# Patient Record
Sex: MALE | Race: WHITE | NOT HISPANIC OR LATINO | Employment: FULL TIME | URBAN - METROPOLITAN AREA
[De-identification: names, ages, dates, MRNs, and addresses within clinical notes are randomized per-mention and may not be internally consistent; named-entity substitution may affect disease eponyms.]

---

## 2023-12-24 ENCOUNTER — APPOINTMENT (OUTPATIENT)
Dept: CARDIOLOGY | Facility: HOSPITAL | Age: 28
End: 2023-12-24

## 2023-12-24 ENCOUNTER — HOSPITAL ENCOUNTER (EMERGENCY)
Facility: HOSPITAL | Age: 28
Discharge: OTHER INSTITUTION | End: 2023-12-28
Attending: INTERNAL MEDICINE

## 2023-12-24 DIAGNOSIS — R45.851 SUICIDAL IDEATION: Primary | ICD-10-CM

## 2023-12-24 LAB
ALBUMIN SERPL BCP-MCNC: 4.4 G/DL (ref 3.4–5)
ALP SERPL-CCNC: 83 U/L (ref 33–120)
ALT SERPL W P-5'-P-CCNC: 15 U/L (ref 10–52)
AMPHETAMINES UR QL SCN: NORMAL
ANION GAP SERPL CALC-SCNC: 12 MMOL/L (ref 10–20)
APAP SERPL-MCNC: <10 UG/ML
AST SERPL W P-5'-P-CCNC: 16 U/L (ref 9–39)
BARBITURATES UR QL SCN: NORMAL
BASOPHILS # BLD AUTO: 0.04 X10*3/UL (ref 0–0.1)
BASOPHILS NFR BLD AUTO: 0.4 %
BENZODIAZ UR QL SCN: NORMAL
BILIRUB SERPL-MCNC: 0.7 MG/DL (ref 0–1.2)
BUN SERPL-MCNC: 8 MG/DL (ref 6–23)
BZE UR QL SCN: NORMAL
CALCIUM SERPL-MCNC: 8.7 MG/DL (ref 8.6–10.3)
CANNABINOIDS UR QL SCN: NORMAL
CHLORIDE SERPL-SCNC: 106 MMOL/L (ref 98–107)
CO2 SERPL-SCNC: 23 MMOL/L (ref 21–32)
CREAT SERPL-MCNC: 0.95 MG/DL (ref 0.5–1.3)
EOSINOPHIL # BLD AUTO: 0.12 X10*3/UL (ref 0–0.7)
EOSINOPHIL NFR BLD AUTO: 1.2 %
ERYTHROCYTE [DISTWIDTH] IN BLOOD BY AUTOMATED COUNT: 12.2 % (ref 11.5–14.5)
ETHANOL SERPL-MCNC: <10 MG/DL
FENTANYL+NORFENTANYL UR QL SCN: NORMAL
GFR SERPL CREATININE-BSD FRML MDRD: >90 ML/MIN/1.73M*2
GLUCOSE SERPL-MCNC: 104 MG/DL (ref 74–99)
HCT VFR BLD AUTO: 49.6 % (ref 41–52)
HGB BLD-MCNC: 17.5 G/DL (ref 13.5–17.5)
IMM GRANULOCYTES # BLD AUTO: 0.03 X10*3/UL (ref 0–0.7)
IMM GRANULOCYTES NFR BLD AUTO: 0.3 % (ref 0–0.9)
LYMPHOCYTES # BLD AUTO: 1.24 X10*3/UL (ref 1.2–4.8)
LYMPHOCYTES NFR BLD AUTO: 12.8 %
MCH RBC QN AUTO: 31.5 PG (ref 26–34)
MCHC RBC AUTO-ENTMCNC: 35.3 G/DL (ref 32–36)
MCV RBC AUTO: 89 FL (ref 80–100)
MONOCYTES # BLD AUTO: 0.61 X10*3/UL (ref 0.1–1)
MONOCYTES NFR BLD AUTO: 6.3 %
NEUTROPHILS # BLD AUTO: 7.63 X10*3/UL (ref 1.2–7.7)
NEUTROPHILS NFR BLD AUTO: 79 %
NRBC BLD-RTO: 0 /100 WBCS (ref 0–0)
OPIATES UR QL SCN: NORMAL
OXYCODONE+OXYMORPHONE UR QL SCN: NORMAL
PCP UR QL SCN: NORMAL
PLATELET # BLD AUTO: 230 X10*3/UL (ref 150–450)
POTASSIUM SERPL-SCNC: 3.2 MMOL/L (ref 3.5–5.3)
PROT SERPL-MCNC: 6.7 G/DL (ref 6.4–8.2)
RBC # BLD AUTO: 5.56 X10*6/UL (ref 4.5–5.9)
SALICYLATES SERPL-MCNC: <3 MG/DL
SARS-COV-2 RNA RESP QL NAA+PROBE: NOT DETECTED
SODIUM SERPL-SCNC: 138 MMOL/L (ref 136–145)
WBC # BLD AUTO: 9.7 X10*3/UL (ref 4.4–11.3)

## 2023-12-24 PROCEDURE — 93005 ELECTROCARDIOGRAM TRACING: CPT

## 2023-12-24 PROCEDURE — 80179 DRUG ASSAY SALICYLATE: CPT | Performed by: PHYSICIAN ASSISTANT

## 2023-12-24 PROCEDURE — 80053 COMPREHEN METABOLIC PANEL: CPT | Performed by: PHYSICIAN ASSISTANT

## 2023-12-24 PROCEDURE — 99285 EMERGENCY DEPT VISIT HI MDM: CPT | Performed by: INTERNAL MEDICINE

## 2023-12-24 PROCEDURE — 80307 DRUG TEST PRSMV CHEM ANLYZR: CPT | Performed by: PHYSICIAN ASSISTANT

## 2023-12-24 PROCEDURE — 36415 COLL VENOUS BLD VENIPUNCTURE: CPT | Performed by: PHYSICIAN ASSISTANT

## 2023-12-24 PROCEDURE — 87635 SARS-COV-2 COVID-19 AMP PRB: CPT | Performed by: PHYSICIAN ASSISTANT

## 2023-12-24 PROCEDURE — 85025 COMPLETE CBC W/AUTO DIFF WBC: CPT | Performed by: PHYSICIAN ASSISTANT

## 2023-12-24 PROCEDURE — 2500000004 HC RX 250 GENERAL PHARMACY W/ HCPCS (ALT 636 FOR OP/ED): Performed by: PHYSICIAN ASSISTANT

## 2023-12-24 RX ORDER — POTASSIUM CHLORIDE 20 MEQ/1
40 TABLET, EXTENDED RELEASE ORAL ONCE
Status: COMPLETED | OUTPATIENT
Start: 2023-12-24 | End: 2023-12-24

## 2023-12-24 RX ADMIN — POTASSIUM CHLORIDE 40 MEQ: 1500 TABLET, EXTENDED RELEASE ORAL at 05:01

## 2023-12-24 SDOH — HEALTH STABILITY: MENTAL HEALTH: HAVE YOU WISHED YOU WERE DEAD OR WISHED YOU COULD GO TO SLEEP AND NOT WAKE UP?: YES

## 2023-12-24 SDOH — HEALTH STABILITY: MENTAL HEALTH: DEPRESSION SYMPTOMS: FEELINGS OF HELPLESSNESS;FEELINGS OF HOPELESSESS;ISOLATIVE;LOSS OF INTEREST

## 2023-12-24 SDOH — HEALTH STABILITY: MENTAL HEALTH: HAVE YOU EVER DONE ANYTHING, STARTED TO DO ANYTHING, OR PREPARED TO DO ANYTHING TO END YOUR LIFE?: YES

## 2023-12-24 SDOH — HEALTH STABILITY: MENTAL HEALTH: RISK OF SUICIDE: HIGH RISK

## 2023-12-24 SDOH — SOCIAL STABILITY: SOCIAL INSECURITY: FAMILY BEHAVIORS: CALM;FLAT AFFECT

## 2023-12-24 SDOH — HEALTH STABILITY: MENTAL HEALTH: HAVE YOU BEEN THINKING ABOUT HOW YOU MIGHT DO THIS?: YES

## 2023-12-24 SDOH — HEALTH STABILITY: MENTAL HEALTH: WAS THIS WITHIN THE PAST THREE MONTHS?: YES

## 2023-12-24 SDOH — HEALTH STABILITY: MENTAL HEALTH: HAVE YOU HAD THESE THOUGHTS AND HAD SOME INTENTION OF ACTING ON THEM?: YES

## 2023-12-24 SDOH — HEALTH STABILITY: MENTAL HEALTH
HAVE YOU STARTED TO WORK OUT OR WORKED OUT THE DETAILS OF HOW TO KILL YOURSELF? DO YOU INTENT TO CARRY OUT THIS PLAN?: YES

## 2023-12-24 SDOH — HEALTH STABILITY: MENTAL HEALTH: BEHAVIORS/MOOD: CALM;COOPERATIVE

## 2023-12-24 SDOH — HEALTH STABILITY: MENTAL HEALTH: HOW DID YOU TRY TO KILL YOURSELF?: CUTTING

## 2023-12-24 SDOH — HEALTH STABILITY: MENTAL HEALTH: HAVE YOU ACTUALLY HAD ANY THOUGHTS OF KILLING YOURSELF?: YES

## 2023-12-24 SDOH — HEALTH STABILITY: MENTAL HEALTH

## 2023-12-24 SDOH — ECONOMIC STABILITY: HOUSING INSECURITY: FEELS SAFE LIVING IN HOME: YES

## 2023-12-24 SDOH — ECONOMIC STABILITY: GENERAL: FINANCIAL CONCERNS: UNABLE TO PAY BILLS

## 2023-12-24 SDOH — HEALTH STABILITY: MENTAL HEALTH: ACTIVE SUICIDAL IDEATION WITH SPECIFIC PLAN AND INTENT (PAST 1 MONTH): YES

## 2023-12-24 SDOH — HEALTH STABILITY: MENTAL HEALTH: WISH TO BE DEAD (PAST 1 MONTH): YES

## 2023-12-24 SDOH — HEALTH STABILITY: MENTAL HEALTH: IN THE PAST WEEK, HAVE YOU BEEN HAVING THOUGHTS ABOUT KILLING YOURSELF?: YES

## 2023-12-24 SDOH — HEALTH STABILITY: MENTAL HEALTH: SUICIDAL BEHAVIOR (DESCRIPTION): CUTTING

## 2023-12-24 SDOH — HEALTH STABILITY: MENTAL HEALTH: SLEEP PATTERN: INSOMNIA

## 2023-12-24 SDOH — HEALTH STABILITY: MENTAL HEALTH: SUICIDAL BEHAVIOR (3 MONTHS): YES

## 2023-12-24 SDOH — HEALTH STABILITY: MENTAL HEALTH: SUICIDE ASSESSMENT: ADULT (C-SSRS)

## 2023-12-24 SDOH — HEALTH STABILITY: MENTAL HEALTH: HAVE YOU EVER TRIED TO KILL YOURSELF?: YES

## 2023-12-24 SDOH — HEALTH STABILITY: MENTAL HEALTH: SUICIDAL BEHAVIOR (LIFETIME): YES

## 2023-12-24 SDOH — HEALTH STABILITY: MENTAL HEALTH: HALLUCINATION: UNABLE TO ASSESS

## 2023-12-24 SDOH — HEALTH STABILITY: MENTAL HEALTH: IN THE PAST FEW WEEKS, HAVE YOU WISHED YOU WERE DEAD?: YES

## 2023-12-24 SDOH — HEALTH STABILITY: MENTAL HEALTH: DESCRIBE YOUR THOUGHTS OF KILLING YOURSELF RIGHT NOW:: NO PLAN CURRENTLY

## 2023-12-24 SDOH — HEALTH STABILITY: MENTAL HEALTH: IN THE PAST FEW WEEKS, HAVE YOU FELT THAT YOU OR YOUR FAMILY WOULD BE BETTER OFF IF YOU WERE DEAD?: YES

## 2023-12-24 SDOH — HEALTH STABILITY: MENTAL HEALTH: ACTIVE SUICIDAL IDEATION WITH SOME INTENT TO ACT, WITHOUT SPECIFIC PLAN (PAST 1 MONTH): YES

## 2023-12-24 SDOH — HEALTH STABILITY: MENTAL HEALTH: NON-SPECIFIC ACTIVE SUICIDAL THOUGHTS (PAST 1 MONTH): YES

## 2023-12-24 SDOH — HEALTH STABILITY: MENTAL HEALTH: DELUSIONS: CONTROLLED

## 2023-12-24 SDOH — HEALTH STABILITY: MENTAL HEALTH: ANXIETY SYMPTOMS: NO PROBLEMS REPORTED OR OBSERVED.

## 2023-12-24 SDOH — HEALTH STABILITY: MENTAL HEALTH: ARE YOU HAVING THOUGHTS OF KILLING YOURSELF RIGHT NOW?: YES

## 2023-12-24 ASSESSMENT — LIFESTYLE VARIABLES
EVER FELT BAD OR GUILTY ABOUT YOUR DRINKING: NO
HAVE YOU EVER FELT YOU SHOULD CUT DOWN ON YOUR DRINKING: NO
SUBSTANCE_ABUSE_PAST_12_MONTHS: NO
REASON UNABLE TO ASSESS: NO
PRESCIPTION_ABUSE_PAST_12_MONTHS: NO
EVER HAD A DRINK FIRST THING IN THE MORNING TO STEADY YOUR NERVES TO GET RID OF A HANGOVER: NO
HAVE PEOPLE ANNOYED YOU BY CRITICIZING YOUR DRINKING: NO

## 2023-12-24 ASSESSMENT — COLUMBIA-SUICIDE SEVERITY RATING SCALE - C-SSRS
1. IN THE PAST MONTH, HAVE YOU WISHED YOU WERE DEAD OR WISHED YOU COULD GO TO SLEEP AND NOT WAKE UP?: YES
2. HAVE YOU ACTUALLY HAD ANY THOUGHTS OF KILLING YOURSELF?: YES
6. HAVE YOU EVER DONE ANYTHING, STARTED TO DO ANYTHING, OR PREPARED TO DO ANYTHING TO END YOUR LIFE?: YES
4. HAVE YOU HAD THESE THOUGHTS AND HAD SOME INTENTION OF ACTING ON THEM?: YES
5. HAVE YOU STARTED TO WORK OUT OR WORKED OUT THE DETAILS OF HOW TO KILL YOURSELF? DO YOU INTEND TO CARRY OUT THIS PLAN?: YES
6. HAVE YOU EVER DONE ANYTHING, STARTED TO DO ANYTHING, OR PREPARED TO DO ANYTHING TO END YOUR LIFE?: YES
1. SINCE LAST CONTACT, HAVE YOU WISHED YOU WERE DEAD OR WISHED YOU COULD GO TO SLEEP AND NOT WAKE UP?: YES
2. HAVE YOU ACTUALLY HAD ANY THOUGHTS OF KILLING YOURSELF?: YES
6. HAVE YOU EVER DONE ANYTHING, STARTED TO DO ANYTHING, OR PREPARED TO DO ANYTHING TO END YOUR LIFE?: NO
5. HAVE YOU STARTED TO WORK OUT OR WORKED OUT THE DETAILS OF HOW TO KILL YOURSELF? DO YOU INTEND TO CARRY OUT THIS PLAN?: YES

## 2023-12-24 ASSESSMENT — PAIN DESCRIPTION - FREQUENCY: FREQUENCY: CONSTANT/CONTINUOUS

## 2023-12-24 ASSESSMENT — PAIN SCALES - GENERAL
PAINLEVEL_OUTOF10: 0 - NO PAIN
PAINLEVEL_OUTOF10: 0 - NO PAIN

## 2023-12-24 ASSESSMENT — PAIN - FUNCTIONAL ASSESSMENT: PAIN_FUNCTIONAL_ASSESSMENT: 0-10

## 2023-12-24 NOTE — PROGRESS NOTES
EPAT - Social Work Psychiatric Assessment    Arrival Details  Mode of Arrival: Ambulance  Admission Source:  ([facility])  Admission Type: Involuntary  EPAT Assessment Start Date: 12/24/23  EPAT Assessment Start Time: 0400  Name of : FATOU Luque LISW    History of Present Illness  Admission Reason: Suicidal    HPI: Pt, who is a 28 year old male, presents to the [facility] ED with a chief complaint of self-injury by cutting and suicidal ideation. Prior to assessment, pt’s provider note, triage note, and community record were reviewed. Pt was in the [facility] tonight waiting for a friend to get off work. “I was bored so I just did it,” according to pt. He cut himself with a knife he had in the car. Pt’s friend found pt bleeding and asked to take him to the ED. Pt refused so his fried surreptitiously called 911. Police and EMS arrived “so I didn’t have much of a choice” in coming to the ED. In the ED, pt triaged as “high risk” on his [place] risk screening tool. Pt reportedly was withdrawn and not providing much of a history when he spoke with ED provider. EPAT was consulted for further evaluation. For this assessment, pt reported that he does not care if he lives or dies but he is not actively suicidal. He is unsure of his intent behind the cutting tonight, specifically; whether or not he wanted to kill himself.         Pt denied having a previous psychiatric history. He does state “this isn’t my first rodeo. This is my second rodeo. My first rodeo was in [place].” Pt explained that his so called “first rodeo” was another episode of cutting. He was vague about whether he was actually admitted to inpatient psychiatry but he does report that he met with a psychiatrist who provided him with mental health information “but I threw it away because I don’t need another problem.” Pt denied having any history of psychiatric treatment beyond the one encounter in [place]. Pt has another ED visit  in May, 2023 to [facility]. At that time, pt claimed that he unintentionally cut himself with a “falling mirror.” Today, pt admits that the incident was intentional self-harm “but I had work so I lied.”         Pt was born and raised in [place]. When asked about a trauma history, pt claims “I can’t remember anything but flashes of stuff from before I was 10 years old.” Pt reported that he was homeschooled after he turned 10 years old. He keeps minimal contact with his mother but otherwise does not have contact with family. Pt moved to [place] 4 years ago. He lives with friends.     Readmission Information   Readmission within 30 Days: No    Psychiatric Symptoms  Anxiety Symptoms: No problems reported or observed.  Depression Symptoms: Feelings of helplessness, Feelings of hopelessess, Isolative, Loss of interest  Kimber Symptoms: No problems reported or observed.    Psychosis Symptoms  Hallucination Type: No problems reported or observed.  Delusion Type: No problems reported or observed.    Additional Symptoms - Adult  Generalized Anxiety Disorder: No problems reported or observed.  Obsessive Compulsive Disorder: No problems reported or observed.  Panic Attack: No problems reported or observed.  Post Traumatic Stress Disorder: No problems reported or observed.  Delirium: No problems reported or observed.    Past Psychiatric History/Meds/Treatments  Past Psychiatric History: Possible admission in 2017/2018  Past Psychiatric Meds/Treatments: Pt denied  Past Violence/Victimization History: Pt alluded to a history of trauma but did not provide specifics.    Current Mental Health Contacts   Name/Phone Number: None   Last Appointment Date: N/A  Provider Name/Phone Number: None  Provider Last Appointment Date: N/A    Support System: Friends    Living Arrangement: Apartment    Home Safety  Feels Safe Living in Home: Yes    Income Information  Employment Status for: Patient  Employment Status:  Employed  Income Source: Employed  Current/Previous Occupation: Service Industry  Income/Expense Information: Expenses exceed income  Financial Concerns: Unable to Pay Bills  Employment/ Finance Comments: Currently works at [employer] and [employer]. History of [employer] Service/Education History  Current or Previous  Service: None  Education Level:  (Home schooled)    Social/Cultural History  Social History: Pt is a 28 year old  male, pink dyed hair, born and raised in [place], living in [place] for 4 years.  Cultural Requests During Hospitalization: None  Spiritual Requests During Hospitalization: None  Important Activities:  (Pt unsure)    Legal  Legal Considerations: Patient/ Family Ability to Make Healthcare Decisions  Assistance with Managing/Advocating Healthcare Needs:  (Self)  Criminal Activity/ Legal Involvement Pertinent to Current Situation/ Hospitalization: None  Legal Concerns: None    Drug Screening  Have you used any substances (canabis, cocaine, heroin, hallucinogens, inhalants, etc.) in the past 12 months?: No  Have you used any prescription drugs other than prescribed in the past 12 months?: No  Is a toxicology screen needed?: Yes         Psychosocial  Behaviors/Mood: Calm, Cooperative, Pleasant    Orientation  Orientation Level: Oriented X4    General Appearance  Motor Activity: Unremarkable  Speech Pattern:  (Unremarkable)  General Attitude: Cooperative (Mildly guarded)  Appearance/Hygiene: Disheveled    Thought Process  Coherency: [place] thinking  Content:  (Endorsing SI)  Delusions:  (None)  Perception: Not altered  Hallucination: None  Judgment/Insight: Poor  Confusion: None  Cognition: Poor judgement         Risk Factors  Self Harm/Suicidal Ideation Plan: No active plan  Previous Self Harm/Suicidal Plans: Cut himself tonight  Risk Factors: Male    Violence Risk Assessment  Assessment of Violence: None noted  Thoughts of Harm to Others: No    Ability to Assess Risk  Screen  Risk Screen - Ability to Assess: Able to be screened  Ask Suicide-Screening Questions  1. In the past few weeks, have you wished you were dead?: Yes  2. In the past few weeks, have you felt that you or your family would be better off if you were dead?: Yes  3. In the past week, have you been having thoughts about killing yourself?: Yes  4. Have you ever tried to kill yourself?: Yes  How did you try to kill yourself?: cutting  When did you try to kill yourself?: 6 or 7 years ago  5. Are you having thoughts of killing yourself right now?: Yes  Describe your thoughts of killing yourself right now:: No plan currently  Calculated Risk Score: Imminent Risk  [place] Suicide Severity Rating Scale (Screener/Recent Self-Report)  1. Wish to be Dead (Past 1 Month): Yes  2. Non-Specific Active Suicidal Thoughts (Past 1 Month): Yes  3. Active Suicidal Ideation with any Methods (Not Plan) Without Intent to Act (Past 1 Month): Yes  4. Active Suicidal Ideation with Some Intent to Act, Without Specific Plan (Past 1 Month): Yes  5. Active Suicidal Ideation with Specific Plan and Intent (Past 1 Month): Yes  6. Suicidal Behavior (Lifetime): Yes  6. Suicidal Behavior (3 Months): Yes  6. Suicidal Behavior (Description): cutting  Calculated C-SSRS Risk Score (Lifetime/Recent): High Risk  Step 1: Risk Factors  Current & Past Psychiatric Dx: Mood disorder, Cluster B personality disorders or traits (i.e., borderline, antisocial, histrionic & narcissistic)  Presenting Symptoms: Impulsivity, Hopelessness or despair  Precipitants/Stressors: Inadequate social supports  Change in Treatment: Non-compliant or not receiving treatment  Access to Lethal Methods : No  Step 2: Protective Factors   Protective Factors External: Engaged in work or school  Step 3: Suicidal Ideation Intensity  How Many Times Have You Had These Thoughts: Daily or almost daily  When You Have the Thoughts How Long do They Last : Fleeting - few seconds or  minutes  Could/Can You Stop Thinking About Killing Yourself or Wanting to Die if You Want to: Does not attempt to control thoughts  Are There Things - Anyone or Anything - That Stopped You From Wanting to Die or Acting on: Uncertain that deterrents stopped you  What Sort of Reasons Did You Have For Thinking About Wanting to Die or Killing Yourself: Completely to end or stop the pain (you couldn't go on living with the pain or how you were feeling)  Total Score: 13  Step 5: Documentation  Risk Level: High suicide risk (Pt maintained at high risk. Discussed with Dr. Crenshaw)    Psychiatric Impression and Plan of Care    Assessment and Plan: Pt is a 28 year old male presenting for psychiatric evaluation with a chief complaint of self-injury and concern for suicidal ideation. On assessment, pt was calm and cooperative. Pt explained that he had intentionally cut himself tonight. When asked if he had suicidal intent, pt stated “That’s the problem…1. People die every day. I’m going to eventually die so who cares when? And 2. I’m not Presybeterian but I don’t have a lot of soheila in the world right now.” Pt never identified any specific stressors leading to cutting behaviors but he does admit the episodes are happening with more regularity. He often thinks about wanting to be dead but worries that his friends and family will be “annoyed with me if I actually did it.” The wound that pt inflicted on himself did require repair tonight. When asked if pt was depressed, he stated “well I was homeschooled so I don’t really know what depression looks like.” Pt presents with a flat affect. He appears to over intellectualize and explain away concerning behaviors as the obvious next step for him. For example, pt stated that he has always wanted to live independently but does not feel safe doing so because he knows he will try to kill himself. On the other hand, pt rejects mental health care because he does not “need another problem.” When  confronted about this apparent contradiction, pt states “I guess I try not to think about it.” He does not present with AH/VH/HI.         Dx: Unspecified depressive disorder         Plan: Pt meets criteria for inpatient psychiatric hospitalization because he poses an acute risk of harm to himself.    Specific Resources Provided to Patient: Inpatient  CM Notified: -  PHP/IOP Recommended: -  Specific Information Provided for PHP/IOP: -  Plan Comments: -    Outcome/Disposition  Patient's Perception of Outcome Achieved: Ambivelent  Assessment, Recommendations and Risk Level Reviewed with: Dr. Crenshaw  Contact Name: -  Contact Number(s): -  Contact Relationship: -  EPAT Assessment Completed Date: 12/24/23  EPAT Assessment Completed Time: 0529

## 2023-12-24 NOTE — ED PROVIDER NOTES
HPI   Chief Complaint   Patient presents with   • Suicidal   • Laceration     Lacerations to left arm self harm       28-year-old male presents today via police/EMS for suicidal ideation.  Patient was found in a parking lot causing superficial cuts to his wrists.  Police were subsequently called.  The patient was found to be uncooperative and voiced intent to harm himself.  On arrival the patient would not provide any information on what transpired today.                          [place] Coma Scale Score: 15                  Patient History   History reviewed. No pertinent past medical history.  History reviewed. No pertinent surgical history.  No family history on file.  Social History     Tobacco Use   • Smoking status: Every Day     Packs/day: 1     Types: Cigarettes   • Smokeless tobacco: Never   Vaping Use   • Vaping Use: Never used   Substance Use Topics   • Alcohol use: Not Currently   • Drug use: Not Currently       Physical Exam   ED Triage Vitals [12/24/23 0202]   Temp Heart Rate Resp BP   -- 94 18 111/64      SpO2 Temp src Heart Rate Source Patient Position   97 % -- Monitor Sitting      BP Location FiO2 (%)     Left arm --       Physical Exam  Vitals and nursing note reviewed.   Constitutional:       General: He is not in acute distress.     Appearance: Normal appearance. He is normal weight. He is not ill-appearing, toxic-appearing or diaphoretic.   HENT:      Head: Normocephalic.      Nose: Nose normal.      Mouth/Throat:      Mouth: Mucous membranes are moist.   Eyes:      Extraocular Movements: Extraocular movements intact.      Conjunctiva/sclera: Conjunctivae normal.   Cardiovascular:      Rate and Rhythm: Normal rate.      Pulses: Normal pulses.   Pulmonary:      Effort: Pulmonary effort is normal. No respiratory distress.   Musculoskeletal:         General: Normal range of motion.      Comments: Numerous superficial abrasions to the volar aspect of the bilateral forearms   Skin:     General: Skin  is warm and dry.   Neurological:      Mental Status: He is alert.         ED Course & MDM   ED Course as of 12/24/23 1846   Sun Dec 24, 2023   0523 Discussed with EPAT and they recommend admission.  [JA]   9621 Received patient in signout.  Patient is pending EPAT placement for suicidal ideation. [ZS]   1618 Hand off to  [ZS]      ED Course User Index  [JA] Michael Crenshaw DO  [ZS] Ricky Arellano MD       Medical Decision Making  Patient was brought in this evening by police/EMS for voicing intent to harm himself.  The patient was found in a parking lot cutting his wrists with an object.  Patient refused to provide any information on arrival.  Given the history of present illness the patient will be evaluated by psychiatry once medically cleared.        Procedure  Procedures     Ganesh Turpin PA-C  12/24/23 6820

## 2023-12-24 NOTE — PROGRESS NOTES
Transition of care note:    ED Course as of 12/24/23 1619   Sun Dec 24, 2023   0523 Discussed with EPAT and they recommend admission.  [JA]   0730 Received patient in signout.  Patient is pending EPAT placement for suicidal ideation. [ZS]   1618 Hand off to  [ZS]      ED Course User Index  [JA] Michael Crenshaw DO  [ZS] Ricky Arellano MD      Vitals:    12/24/23 1350   BP: 132/63   Pulse: 84   Resp: 18   Temp: 36.7 °C (98.1 °F)   SpO2: 94%     Results for orders placed or performed during the hospital encounter of 12/24/23 (from the past 24 hour(s))   CBC and Auto Differential   Result Value Ref Range    WBC 9.7 4.4 - 11.3 x10*3/uL    nRBC 0.0 0.0 - 0.0 /100 WBCs    RBC 5.56 4.50 - 5.90 x10*6/uL    Hemoglobin 17.5 13.5 - 17.5 g/dL    Hematocrit 49.6 41.0 - 52.0 %    MCV 89 80 - 100 fL    MCH 31.5 26.0 - 34.0 pg    MCHC 35.3 32.0 - 36.0 g/dL    RDW 12.2 11.5 - 14.5 %    Platelets 230 150 - 450 x10*3/uL    Neutrophils % 79.0 40.0 - 80.0 %    Immature Granulocytes %, Automated 0.3 0.0 - 0.9 %    Lymphocytes % 12.8 13.0 - 44.0 %    Monocytes % 6.3 2.0 - 10.0 %    Eosinophils % 1.2 0.0 - 6.0 %    Basophils % 0.4 0.0 - 2.0 %    Neutrophils Absolute 7.63 1.20 - 7.70 x10*3/uL    Immature Granulocytes Absolute, Automated 0.03 0.00 - 0.70 x10*3/uL    Lymphocytes Absolute 1.24 1.20 - 4.80 x10*3/uL    Monocytes Absolute 0.61 0.10 - 1.00 x10*3/uL    Eosinophils Absolute 0.12 0.00 - 0.70 x10*3/uL    Basophils Absolute 0.04 0.00 - 0.10 x10*3/uL   Comprehensive Metabolic Panel   Result Value Ref Range    Glucose 104 (H) 74 - 99 mg/dL    Sodium 138 136 - 145 mmol/L    Potassium 3.2 (L) 3.5 - 5.3 mmol/L    Chloride 106 98 - 107 mmol/L    Bicarbonate 23 21 - 32 mmol/L    Anion Gap 12 10 - 20 mmol/L    Urea Nitrogen 8 6 - 23 mg/dL    Creatinine 0.95 0.50 - 1.30 mg/dL    eGFR >90 >60 mL/min/1.73m*2    Calcium 8.7 8.6 - 10.3 mg/dL    Albumin 4.4 3.4 - 5.0 g/dL    Alkaline Phosphatase 83 33 - 120 U/L    Total Protein 6.7 6.4 - 8.2  g/dL    AST 16 9 - 39 U/L    Bilirubin, Total 0.7 0.0 - 1.2 mg/dL    ALT 15 10 - 52 U/L   Acute Toxicology Panel, Blood   Result Value Ref Range    Acetaminophen <10.0 10.0 - 30.0 ug/mL    Salicylate  <3 4 - 20 mg/dL    Alcohol <10 <=10 mg/dL   SARS-CoV-2 RT PCR   Result Value Ref Range    Coronavirus 2019, PCR Not Detected Not Detected   Drug Screen, Urine   Result Value Ref Range    Amphetamine Screen, Urine Presumptive Negative Presumptive Negative    Barbiturate Screen, Urine Presumptive Negative Presumptive Negative    Benzodiazepines Screen, Urine Presumptive Negative Presumptive Negative    Cannabinoid Screen, Urine Presumptive Negative Presumptive Negative    Cocaine Metabolite Screen, Urine Presumptive Negative Presumptive Negative    Fentanyl Screen, Urine Presumptive Negative Presumptive Negative    Opiate Screen, Urine Presumptive Negative Presumptive Negative    Oxycodone Screen, Urine Presumptive Negative Presumptive Negative    PCP Screen, Urine Presumptive Negative Presumptive Negative      Procedures

## 2023-12-24 NOTE — ED PROCEDURE NOTE
"Capacity Assessment Tool    \"Capacity\" is the \"ability\" to make a decision.  The decision in question must be specific (one decision), relevant to a patient's current condition (appropriate), and timely (neither prospective nor retrospective).    Capacity varies based on knowledge base (explanation/understanding of clinical information), cognitive processing, acute psychiatric illness, and other clinical conditions.    In order to be deemed \"capacitated\" to make a single decision at one point in time, a patient must demonstrate all 4 of the following elements:    *Ability to consistently communicate a choice (consistent over time with adequate information)  *Ability to understand the relevant information (accurate knowledge of condition)  *Ability to appreciate the situation and its consequences (risks/benefits, pros/cons)  *Ability to reason about treatment options (without undue influence of a person or condition, eg. suicidality or acute psychosis)      Current Decision    Clinical issue:   Suicidal ideation    Did the appropriate team address relevant information with the patient:  Yes    Date: 12/24/2023    If \"NO\" is selected for appropriate team, then please discuss with the appropriate team.  The appropriate team should be encouraged to address relevant information with the patient AND reevaluate capacity when appropriate.    Capacity Evaluation    Patient demonstrates ability to consistently communicate choice:  Yes, communicates thoughts of self-harm    Patient demonstrates ability to understand the relevant information:  No unclear as to whether or not patient knows that the self-harm will result in his death    Patient demonstrates ability to appreciate the situation and its consequences:  No unclear as to whether or not patient knows that the self-harm will result in his death    Patient demonstrates ability to reason about treatment options:  No unable to determine due to speech pattern    If ANY of " "the above items are answered \"NO,\" the patient LACKS CAPACITY for that specific decision at hand, at that specific time.  Further capacity evaluations can be done as needed.            Michael Crenshaw,   12/24/23 0527    "

## 2023-12-25 LAB
APPEARANCE UR: ABNORMAL
BILIRUB UR STRIP.AUTO-MCNC: ABNORMAL MG/DL
COLOR UR: ABNORMAL
GLUCOSE UR STRIP.AUTO-MCNC: NEGATIVE MG/DL
KETONES UR STRIP.AUTO-MCNC: ABNORMAL MG/DL
LEUKOCYTE ESTERASE UR QL STRIP.AUTO: NEGATIVE
MUCOUS THREADS #/AREA URNS AUTO: NORMAL /LPF
NITRITE UR QL STRIP.AUTO: NEGATIVE
PH UR STRIP.AUTO: 5 [PH]
PROT UR STRIP.AUTO-MCNC: ABNORMAL MG/DL
RBC # UR STRIP.AUTO: NEGATIVE /UL
RBC #/AREA URNS AUTO: NORMAL /HPF
SP GR UR STRIP.AUTO: 1.03
UROBILINOGEN UR STRIP.AUTO-MCNC: 4 MG/DL
WBC #/AREA URNS AUTO: NORMAL /HPF

## 2023-12-25 PROCEDURE — 81003 URINALYSIS AUTO W/O SCOPE: CPT | Performed by: EMERGENCY MEDICINE

## 2023-12-25 RX ORDER — ACETAMINOPHEN 325 MG/1
650 TABLET ORAL ONCE
Status: COMPLETED | OUTPATIENT
Start: 2023-12-25 | End: 2023-12-25

## 2023-12-25 RX ADMIN — ACETAMINOPHEN 650 MG: 325 TABLET ORAL at 21:35

## 2023-12-25 RX ADMIN — ACETAMINOPHEN 650 MG: 325 TABLET ORAL at 12:24

## 2023-12-25 SDOH — HEALTH STABILITY: MENTAL HEALTH: HAVE YOU WISHED YOU WERE DEAD OR WISHED YOU COULD GO TO SLEEP AND NOT WAKE UP?: YES

## 2023-12-25 SDOH — HEALTH STABILITY: MENTAL HEALTH: BEHAVIORS/MOOD: CALM;COOPERATIVE;SLEEPING

## 2023-12-25 SDOH — HEALTH STABILITY: MENTAL HEALTH: BEHAVIORS/MOOD: CALM;COOPERATIVE

## 2023-12-25 SDOH — HEALTH STABILITY: MENTAL HEALTH: HAVE YOU ACTUALLY HAD ANY THOUGHTS OF KILLING YOURSELF?: YES

## 2023-12-25 SDOH — HEALTH STABILITY: MENTAL HEALTH: HAVE YOU BEEN THINKING ABOUT HOW YOU MIGHT DO THIS?: YES

## 2023-12-25 SDOH — HEALTH STABILITY: MENTAL HEALTH: HAVE YOU HAD THESE THOUGHTS AND HAD SOME INTENTION OF ACTING ON THEM?: YES

## 2023-12-25 SDOH — HEALTH STABILITY: MENTAL HEALTH: HAVE YOU EVER DONE ANYTHING, STARTED TO DO ANYTHING, OR PREPARED TO DO ANYTHING TO END YOUR LIFE?: YES

## 2023-12-25 SDOH — HEALTH STABILITY: MENTAL HEALTH: WAS THIS WITHIN THE PAST THREE MONTHS?: YES

## 2023-12-25 SDOH — HEALTH STABILITY: MENTAL HEALTH: RISK OF SUICIDE: HIGH RISK

## 2023-12-25 SDOH — HEALTH STABILITY: MENTAL HEALTH: CONTENT: UNREMARKABLE

## 2023-12-25 SDOH — HEALTH STABILITY: MENTAL HEALTH: BEHAVIORS/MOOD: COOPERATIVE;SLEEPING

## 2023-12-25 SDOH — HEALTH STABILITY: MENTAL HEALTH: BEHAVIORS/MOOD: CALM;COOPERATIVE;PLEASANT

## 2023-12-25 SDOH — HEALTH STABILITY: MENTAL HEALTH: BEHAVIORS/MOOD: SLEEPING;COOPERATIVE

## 2023-12-25 SDOH — HEALTH STABILITY: MENTAL HEALTH

## 2023-12-25 ASSESSMENT — PAIN SCALES - GENERAL
PAINLEVEL_OUTOF10: 0 - NO PAIN
PAINLEVEL_OUTOF10: 5 - MODERATE PAIN

## 2023-12-25 ASSESSMENT — PAIN - FUNCTIONAL ASSESSMENT: PAIN_FUNCTIONAL_ASSESSMENT: 0-10

## 2023-12-25 NOTE — PROGRESS NOTES
Emergency Medicine Transition of Care Note.    I received Zeus Guzman in signout from Dr. Jones.  Please see the previous ED provider note for all HPI, PE and MDM up to the time of signout at 0109. This is in addition to the primary record.    In brief Zeus Guzman is an 28 y.o. male presenting for   Chief Complaint   Patient presents with    Suicidal    Laceration     Lacerations to left arm self harm     At the time of signout we were awaiting: Placement by EPAT    ED Course as of 12/25/23 0632   Sun Dec 24, 2023   0523 Discussed with EPAT and they recommend admission.  [JA]   0730 Received patient in signout.  Patient is pending EPAT placement for suicidal ideation. [ZS]   1618 Hand off to  [ZS]      ED Course User Index  [JA] Michael Crenshaw DO  [ZS] Ricky Arellano MD         Diagnoses as of 12/25/23 0632   Suicidal ideation       Medical Decision Making    Patient care transferred to oncoming physician pending placement by EPAT.  Final diagnoses:   [R47.922] Suicidal ideation           Procedure  Procedures    Michael Crenshaw DO

## 2023-12-25 NOTE — PROGRESS NOTES
"PSYCHIATRY PROGRESS NOTE    Visit type: Virtual evaluation    Patient information:  Zeus Guzman is a 28 y.o. male, lives with a friend, employed as [employer] ; PMH of Asthma; no formal PPH. Pateint presents to [facility] ED for Self inflicted wounds and SI.    INTERVAL HISTORY      Chart reviewed and patient seen. Labs WNL, utox negative. VSS. No STAT/PRN medications over the last 24 hours.     On assessment, patient reported that he came to the ED as he was cutting himself on his arm, in form of \"stress relief\". He has extensive hx of SIB behavior, only seen by a therapist, no psychiatry and no medications, and his therapist did mention borderline personality disorder. Reports SI is always there 24/7 since his teen years, with no intent or plan.     He reported that they are some sad mood with poor concentration, energy and suicidality, and anxiety. Denies kimber, but also reported that sometimes he is so deep in his thoughts he can actually see what he is thinking in reality. Regarding safety, patient is not confident that if he gets discharged, he can refrain from cutting and suicidal thoughts are still there, and patient is impulsive, with poor frustration tolerance. Patient is not able to identify protective or deterrent factors.    PSYCHIATRIC REVIEW OF SYSTEMS  Depression: psychomotor agitation or retardation and recurrent thoughts of death or suicidal ideation  Anxiety: excessive worry that is difficult to control, restlessness or feeling keyed up or on edge, and difficulty concentrating  Kimber: negative  Psychosis: negative  Delirium: negative   Trauma: negative    OBJECTIVE     Last Recorded Vitals  /59   Pulse (!) 119   Temp 36.3 °C (97.3 °F) (Temporal)   Resp 17   Ht 1.651 m (5' 5\")   Wt 68 kg (150 lb)   SpO2 95%   BMI 24.96 kg/m²   Body mass index is 24.96 kg/m².  Facility age limit for growth %marissa is 20 years.  Wt Readings from Last 4 Encounters:   12/24/23 68 kg (150 lb) " "      Mental Status Exam  General: NAD  Appearance: Appeared as age stated; appropriately dressed/groomed.  Attitude: Guarded and superficially cooperative  Behavior: Fair EC; overall responding appropriately  Motor Activity: No notable jacek PMAR  Speech: Clear, with fair phonation, and no lisp nor dysarthria.   Mood: \"fine\"  Affect: Dysthymic; constricted range/intensity; appropriate and congruent  Thought Process: Linear and logical; not perseverating   Thought Content: Endorsed SI. Denying HI. Not voicing/endorsing delusions.  Thought Perception: Did not appear to be responding to internal stimuli. Not endorsing AVH  Cognition: Grossly intact; A&O x4/4 to self, place, date, and context.  Insight: Limited  Judgement: Limited    CURRENT MEDICATIONS  Scheduled medications      Continuous medications      PRN medications       LABS  No results found for this or any previous visit (from the past 24 hour(s)).     IMAGING  No results found.     ASSESSMENT:     PSYCHIATRIC RISK ASSESSMENT  Violence Risk Factors:  male  Acute Risk of Harm to Others is Considered: Low  Suicide Risk Factors: male, history of trauma or abuse, personality disorder (antisocial/borderline), current psychiatric illness, feelings of hopelessness, anxious ruminations, decreased self-esteem, and lack of treatment access, discontinuities in treatment, or recent discharge from hospital  Protective Factors: none  Acute Risk of Harm to Self is Considered: High    DIAGNOSIS  Unspecified mood disorder  R/o Borderline personality disorder    IMPRESSION  Zeus Guzman is a 28 y.o. male, lives with a friend, employed as [employer] ; PMH of Asthma; no formal PPH. Pateint presents to [facility] ED for Self inflicted wounds and SI.    On assessment, patient calm and cooperative, but guarded, with ongoing depressive symptoms and chronic SI with no intent and plan at this time. Patient is unable to identify protective/deterrent factors, poor social " support, and not confident that he will refrain from self-harming behaviors or suicide, in addition to no current connection with  resources. Patient reported poor frustration tolerance, and impulsivity. He denies HI/AVH. A this time, patient continues to be a risk to self, and meets criteria for IP psychiatric level of care.      RECOMMENDATIONS:   SAFETY  - Patient meet criteria for inpatient psychiatric admission.   - Patient lacks the capacity to leave AMA at this time and thus cannot leave AMA. Call CODE VIOLET if patient attempts to leave AMA.  - Patient requires a 1:1 sitter from a psychiatric perspective at this time.    WORKUP  - NA    MEDICATIONS  - No standing medications at this time    - Discussed recommendations with primary team.  - Psychiatry will continue to follow    Patient staffed/discussed with Dr. Arellano, who agrees with above plan.  Thank you for allowing us to participate in the care of this patient. Please page [phone] with any questions or concerns.    Alan Wilson MD  Addiction Psychiatry Fellow, PGY5  [facility]

## 2023-12-26 ENCOUNTER — DOCUMENTATION (OUTPATIENT)
Dept: BEHAVIORAL HEALTH | Facility: HOSPITAL | Age: 28
End: 2023-12-26

## 2023-12-26 LAB — HOLD SPECIMEN: NORMAL

## 2023-12-26 PROCEDURE — 99213 OFFICE O/P EST LOW 20 MIN: CPT | Mod: GT | Performed by: PSYCHIATRY & NEUROLOGY

## 2023-12-26 PROCEDURE — 99213 OFFICE O/P EST LOW 20 MIN: CPT | Performed by: PSYCHIATRY & NEUROLOGY

## 2023-12-26 SDOH — HEALTH STABILITY: MENTAL HEALTH: RISK OF SUICIDE: HIGH RISK

## 2023-12-26 SDOH — HEALTH STABILITY: MENTAL HEALTH: SUICIDE ASSESSMENT: ADULT (C-SSRS)

## 2023-12-26 SDOH — HEALTH STABILITY: MENTAL HEALTH: NEEDS EXPRESSED: DENIES

## 2023-12-26 SDOH — HEALTH STABILITY: MENTAL HEALTH: BEHAVIORS/MOOD: VERBAL;COOPERATIVE;FLAT AFFECT

## 2023-12-26 SDOH — SOCIAL STABILITY: SOCIAL NETWORK: VISITOR BEHAVIORS: APPROPRIATE FOR SITUATION

## 2023-12-26 SDOH — HEALTH STABILITY: MENTAL HEALTH: CONTENT: UNREMARKABLE

## 2023-12-26 SDOH — SOCIAL STABILITY: SOCIAL INSECURITY: FAMILY BEHAVIORS: CALM;FLAT AFFECT

## 2023-12-26 SDOH — SOCIAL STABILITY: SOCIAL INSECURITY: FAMILY BEHAVIORS: CALM

## 2023-12-26 SDOH — HEALTH STABILITY: MENTAL HEALTH: SLEEP PATTERN: INSOMNIA

## 2023-12-26 SDOH — HEALTH STABILITY: MENTAL HEALTH: BEHAVIORS/MOOD: SLEEPING

## 2023-12-26 SDOH — HEALTH STABILITY: MENTAL HEALTH: WAS THIS WITHIN THE PAST THREE MONTHS?: YES

## 2023-12-26 SDOH — HEALTH STABILITY: MENTAL HEALTH

## 2023-12-26 SDOH — HEALTH STABILITY: MENTAL HEALTH: HAVE YOU WISHED YOU WERE DEAD OR WISHED YOU COULD GO TO SLEEP AND NOT WAKE UP?: YES

## 2023-12-26 SDOH — HEALTH STABILITY: MENTAL HEALTH: SLEEP PATTERN: NAPS DURING THE DAY

## 2023-12-26 SDOH — HEALTH STABILITY: MENTAL HEALTH: BEHAVIORS/MOOD: ANXIOUS

## 2023-12-26 SDOH — HEALTH STABILITY: MENTAL HEALTH: HAVE YOU ACTUALLY HAD ANY THOUGHTS OF KILLING YOURSELF?: YES

## 2023-12-26 SDOH — HEALTH STABILITY: MENTAL HEALTH: HAVE YOU HAD THESE THOUGHTS AND HAD SOME INTENTION OF ACTING ON THEM?: YES

## 2023-12-26 SDOH — HEALTH STABILITY: MENTAL HEALTH: HAVE YOU EVER DONE ANYTHING, STARTED TO DO ANYTHING, OR PREPARED TO DO ANYTHING TO END YOUR LIFE?: YES

## 2023-12-26 SDOH — HEALTH STABILITY: MENTAL HEALTH: BEHAVIORS/MOOD: CALM;COOPERATIVE

## 2023-12-26 SDOH — HEALTH STABILITY: MENTAL HEALTH: HAVE YOU BEEN THINKING ABOUT HOW YOU MIGHT DO THIS?: YES

## 2023-12-26 ASSESSMENT — PAIN SCALES - GENERAL: PAINLEVEL_OUTOF10: 0 - NO PAIN

## 2023-12-26 NOTE — PROGRESS NOTES
In short this is a 28-year-old male presenting to the emergency department for suicidal gesture.  Prior to myself taking over care patient's been medically cleared and evaluated by EPAT he is pending placement at facility.  Under my care has been hemodynamically stable calm cooperative.  He is agreeable with plan.  EPAT is still pending placement was signed out to the oncoming physician to follow-up with this.

## 2023-12-26 NOTE — PROGRESS NOTES
"Subjective   Patient ID: Zeus Guzman is a 28 y.o. male with no psychiatric diagnoses who presents to the ER after making superficial cuts on both forearms. Chart reviewed, and patient interviewed, who gave permission for A/V telehealth interview. Briefly, he reported to the ED provider that he was \"bored,\" and did not have any specific trigger for cutting; it was unplanned. However, he does have a history of cutting self, and in 5/2023 cut deeply enough to require stitches. He was not able to identify any supports, and the recommendation was made that he does meet criteria for inpatient involuntary hospitalization.    On interview today, he cannot articulate why he cut himself, but his answers are vague and evasive. He denies current SI or plan, but says that he doesn't care if he lives or dies. He denies psychosis of any type. He denies any clustered manic symptoms. He has never taken any psychiatric medications; he did not want to because his parents took many medications, and he does not want to \"rely\" on medications. He has had therapy twice in the past but most recently when he was a teenager. He is interested in restarting therapy but is vague about whether he has time to do it.          Objective   MSE: grooming fair, appeared stated age. Good eye contact. Speech normal rate and volume. Mood: \"I don't care if I live or die.\" Affect restricted to lower range. No thought d/o, no active S/HI, no paranoia or delusions. Did not appear internally stimulated; denied hallucinations of any type. Cognition grossly intact. Insight/judgment poor.    Impression:  - mood disorder, unspecified    Recommendations:  - Patient does continue to meet criteria for involuntary inpatient hospitalization due to danger to self/others. EPAT continuing to look for placement.   - He cannot leave AMA; please call code violet and utilize capacity evalution document as needed.   - Recommend sitter for psychiatric reasons  - Will not " start medication today d/t patient resistance to this.  - If needed, would use standard PRN order set of medications.    Molly Malloy M.D.  Psychiatry, [facility]

## 2023-12-27 LAB — SARS-COV-2 RNA RESP QL NAA+PROBE: NOT DETECTED

## 2023-12-27 PROCEDURE — 99215 OFFICE O/P EST HI 40 MIN: CPT

## 2023-12-27 PROCEDURE — 87635 SARS-COV-2 COVID-19 AMP PRB: CPT | Performed by: PHYSICIAN ASSISTANT

## 2023-12-27 SDOH — HEALTH STABILITY: MENTAL HEALTH: BEHAVIORS/MOOD: CALM;COOPERATIVE

## 2023-12-27 SDOH — HEALTH STABILITY: MENTAL HEALTH: BEHAVIORS/MOOD: CALM

## 2023-12-27 SDOH — HEALTH STABILITY: MENTAL HEALTH: BEHAVIORS/MOOD: SLEEPING

## 2023-12-27 SDOH — HEALTH STABILITY: MENTAL HEALTH: WAS THIS WITHIN THE PAST THREE MONTHS?: YES

## 2023-12-27 SDOH — HEALTH STABILITY: MENTAL HEALTH: HAVE YOU HAD THESE THOUGHTS AND HAD SOME INTENTION OF ACTING ON THEM?: YES

## 2023-12-27 SDOH — HEALTH STABILITY: MENTAL HEALTH: HAVE YOU EVER DONE ANYTHING, STARTED TO DO ANYTHING, OR PREPARED TO DO ANYTHING TO END YOUR LIFE?: YES

## 2023-12-27 SDOH — HEALTH STABILITY: MENTAL HEALTH: HAVE YOU WISHED YOU WERE DEAD OR WISHED YOU COULD GO TO SLEEP AND NOT WAKE UP?: YES

## 2023-12-27 SDOH — HEALTH STABILITY: MENTAL HEALTH: HAVE YOU ACTUALLY HAD ANY THOUGHTS OF KILLING YOURSELF?: YES

## 2023-12-27 SDOH — HEALTH STABILITY: MENTAL HEALTH: HAVE YOU BEEN THINKING ABOUT HOW YOU MIGHT DO THIS?: YES

## 2023-12-27 SDOH — HEALTH STABILITY: MENTAL HEALTH: RISK OF SUICIDE: HIGH RISK

## 2023-12-27 ASSESSMENT — PAIN SCALES - GENERAL: PAINLEVEL_OUTOF10: 0 - NO PAIN

## 2023-12-27 NOTE — ED NOTES
"Spoke with Shorty at [facility].  [facility] was going to [facility] pt, however the pt needs to be out of locking restraints for at least 4 hours, so now [facility] will not accept at this time and Shorty states \"we will call you back if he can go there or if he is accepted later \"     Hina Glover RN  12/27/23 [id number]    "

## 2023-12-27 NOTE — PROGRESS NOTES
I received this patient in signout from Dr. Melara.    Pending transfer to psychiatric facility at the time of signout.    Continues to wait for transfer to a psychiatric facility.

## 2023-12-27 NOTE — PROGRESS NOTES
"Zeus Guzman is a 28 y.o. male on day 3 of admission, awaits bed at [facility]. Pt arrived to [facility] on 12/24/23 following an episode of self-harm & SI with no plan/intent, was recommended for IP admission. Pt being followed by psychiatry daily.    Subjective   Pt seen via telemedicine. Confirms identity & consents to interview.     No report of confirmed psychiatric diagnosis. Initial consult mentions that prior therapist considered BPD (per patient?). Pt remains calm, no PRNs required overnight.    Pt continues to be withdrawn, evasive, vague. Reports continued SI, says it is unchanged from his baseline SI; denies plans. Reports he has never had a suicide attempt, but does report in 05/2023 accidentally cutting too deep during self-harm episode & required sutures. He cannot name current stressors that led to self-harm episode on day of admission. He has no future goals or plans. Reports no social supports or outpatient services currently. He grew up in [place] & moved to [place] 6 years ago due to \"boredom. 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 Difficult to engage, poor eye contact, but is polite. Reports poor appetite, fair sleep. He reports the first time he cut was between ages [age] y/o. He uses a pocket knife to make lacerations.     No scheduled meds recommended at this time, pt voices no desire for mediation management to assist with mood/thoughts.     Objective     Last Recorded Vitals  Blood pressure 129/75, pulse 96, temperature 36.3 °C (97.3 °F), temperature source Temporal, resp. rate 20, height 1.651 m (5' 5\"), weight 68 kg (150 lb), SpO2 93 %.    Review of Systems    Psychiatric ROS - Adult  Anxiety: Negative  Depression: anhedonia, appetite decreased, hopeless, suicidal thoughts, and withdrawn  Delirium: negative  Psychosis: negative  Kimber: negative  Safety Issues: suicidal " "ideation      Physical Exam      Mental Status Exam  General: 29 y/o  male, pink highlights, wearing hospital gown  Appearance: appears stated age  Attitude: calm, guarded, superficially cooperative   Behavior: poor EC  Motor Activity: no PMAR. Gait not assessed./   Speech: low tone, volume. Appropriate rate & rhythm. Non-spontaneous. Fluent.   Mood: \"I don't know\"  Affect: flat  Thought Process: linear & logical.   Thought Content: (+) SI. No HI or delusions.   Thought Perception: denies AVH. Does not appear to be RTIS.   Cognition: alert, oriented x 3.  Insight: impaired  Judgement: limited    Psychiatric Risk Assessment  Violence Risk Assessment: male  Acute Risk of Harm to Others is Considered: low   Suicide Risk Assessment: , feelings of hopelessness, living alone or lack of social support, male, suicidal ideations, and unmarried  Protective Factors against Suicide: none  Acute Risk of Harm to Self is Considered: moderate/high    Relevant Results  No results found for this or any previous visit (from the past 24 hour(s)).    Scheduled medications    Continuous medications    PRN medications    Assessment/Plan     Impression  Unspecified mood disorder     R/o Borderline Personality Disorder    RECOMMENDATIONS  - patient DOES currently meet criteria for inpatient psychiatric hospitalization; EPAT working on placement  - Issue Application for Emergency Admission (pink slip) only after patient is accepted to an inpatient psychiatric unit and is ready to be discharged. Search “Application for Emergency Admission” under SmartText.”  - Patient lacks the capacity to leave AMA at this time and thus cannot leave AMA. Call CODE VIOLET if patient attempts to leave AMA.  - To evaluate decision-making capacity, recommend use of the Capacity Evaluation Tool. Search “ IP Capacity Evaluation under SmartText\" unless the patient has a legal guardian, in which case all decisions per the legal guardian.  - " Patient DOES require a 1:1 sitter from a psychiatric perspective at this time.  - Would secure all personal possessions and keep clad in hospital gown    Reviewed above recommendations with current ED provider, Dr. Marcela Melara.        I spent 45 minutes in the professional and overall care of this patient.      Chrissy Rashid, APRN-CNP

## 2023-12-28 VITALS
WEIGHT: 150 LBS | HEIGHT: 65 IN | BODY MASS INDEX: 24.99 KG/M2 | SYSTOLIC BLOOD PRESSURE: 127 MMHG | RESPIRATION RATE: 17 BRPM | TEMPERATURE: 97.7 F | DIASTOLIC BLOOD PRESSURE: 64 MMHG | OXYGEN SATURATION: 96 % | HEART RATE: 93 BPM

## 2023-12-28 SDOH — HEALTH STABILITY: MENTAL HEALTH: BEHAVIORS/MOOD: APPROPRIATE FOR SITUATION

## 2023-12-28 SDOH — HEALTH STABILITY: MENTAL HEALTH: BEHAVIORS/MOOD: CALM

## 2023-12-28 NOTE — PROGRESS NOTES
Transition of care note:    ED Course as of 12/28/23 0340   [place] Dec 24, 2023   0523 Discussed with EPAT and they recommend admission.  [JA]   0730 Received patient in signout.  Patient is pending EPAT placement for suicidal ideation. [ZS]   1618 Hand off to  [ZS]   Mon Dec 25, 2023   2006 Patient has been medically cleared for inpatient psychiatric admission. [MK]   Thu Dec 28, 2023   0339 Received patient in signout for placement.  Patient has been accepted to [facility] behavioral unit by  [ZS]      ED Course User Index  [JA] Michael Crenshaw DO  [MK] Celio Yung MD  [ZS] Ricky Arellano MD         Diagnoses as of 12/28/23 0340   Suicidal ideation      Vitals:    12/28/23 0200   BP: 110/69   Pulse: 66   Resp: 20   Temp: 36.2 °C (97.2 °F)   SpO2: 98%     Results for orders placed or performed during the hospital encounter of 12/24/23 (from the past 24 hour(s))   Sars-CoV-2 PCR, Symptomatic   Result Value Ref Range    Coronavirus 2019, PCR Not Detected Not Detected      Procedures

## 2023-12-28 NOTE — SIGNIFICANT EVENT
Application for Emergency Admission      Ready for Transfer?  Is the patient medically cleared for transfer to inpatient psychiatry: Yes  Has the patient been accepted to an inpatient psychiatric hospital: Yes    Application for Emergency Admission  IN ACCORDANCE WITH [place] 5122[place]10 [place]  The Chief Clinical Officer of: [facility] 12/28/2023 .3:41 AM    Reason for Hospitalization  The undersigned has reason to believe that: Zeus Guzman Is a mentally ill person subject to hospitalization by court order under division B Section 5122.01 of the Revised Code, i.e., this person:    1.Yes  Represents a substantial risk of physical harm to self as manifested by evidence of threats of, or attempts at, suicide or serious self-inflicted bodily harm    2.No Represents a substantial risk of physical harm to others as manifested by evidence of recent homicidal or other violent behavior, evidence of recent threats that place another in reasonable fear of violent behavior and serious physical harm, or other evidence of present dangerousness    3.No Represents a substantial and immediate risk of serious physical impairment or injury to self as manifested by  evidence that the person is unable to provide for and is not providing for the person's basic physical needs because of the person's mental illness and that appropriate provision for those needs cannot be made  immediately available in the community    4.No Would benefit from treatment in a hospital for his mental illness and is in need of such treatment as manifested by evidence of behavior that creates a grave and imminent risk to substantial rights of others or  himself.    5.No Would benefit from treatment as manifested by evidence of behavior that indicates all of the following:       (a) The person is unlikely to survive safely in the community without supervision, based on a clinical determination.       (b) The person has a history of lack of compliance with  treatment for mental illness and one of the following applies:      (i) At least twice within the thirty-six months prior to the filing of an affidavit seeking court-ordered treatment of the person under section 5122.111 of the Revised Code, the lack of compliance has been a significant factor in necessitating hospitalization in a hospital or receipt of services in a forensic or other mental health unit of a correctional facility, provided that the thirty-six-month period shall be extended by the length of any hospitalization or incarceration of the person that occurred within the thirty-six-month period.      (ii) Within the forty-eight months prior to the filing of an affidavit seeking court-ordered treatment of the person under section 5122.111 of the Revised Code, the lack of compliance resulted in one or more acts of serious violent behavior toward self or others or threats of, or attempts at, serious physical harm to self or others, provided that the forty-eight-month period shall be extended by the length of any hospitalization or incarceration of the person that occurred within the forty-eight-month period.      (c) The person, as a result of mental illness, is unlikely to voluntarily participate in necessary treatment.       (d) In view of the person's treatment history and current behavior, the person is in need of treatment in order to prevent a relapse or deterioration that would be likely to result in substantial risk of serious harm to the person or others.    (e) Represents a substantial risk of physical harm to self or others if allowed to remain at liberty pending examination.    Therefore, it is requested that said person be admitted to the above named facility.    STATEMENT OF BELIEF    Must be filled out by one of the following: a psychiatrist, licensed physician, licensed clinical psychologist, health or ,  or .  (Statement shall include the circumstances under  which the individual was taken into custody and the reason for the person's belief that hospitalization is necessary. The statement shall also include a reference to efforts made to secure the individual's property at his residence if he was taken into custody there. Every reasonable and appropriate effort should be made to take this person into custody in the least conspicuous manner possible.)    I believe patient requires acute psychiatric evaluation treatment and stabilization for suicidal ideation    Ricky Arellano MD 12/28/2023     _____________________________________________________________   Place of Employment: [employer]    STATEMENT OF OBSERVATION BY PSYCHIATRIST, LICENSED PHYSICIAN, OR LICENSED CLINICAL PSYCHOLOGIST, IF APPLICABLE    Place of Observation (e.g., [facility], general hospital, office, emergency facility)  (If applicable, please complete)    Ricky Arellano MD 12/28/2023    _____________________________________________________________

## 2024-01-10 LAB
ATRIAL RATE: 78 BPM
P AXIS: 70 DEGREES
P OFFSET: 194 MS
P ONSET: 144 MS
PR INTERVAL: 154 MS
Q ONSET: 221 MS
QRS COUNT: 13 BEATS
QRS DURATION: 78 MS
QT INTERVAL: 374 MS
QTC CALCULATION(BAZETT): 426 MS
QTC FREDERICIA: 408 MS
R AXIS: 74 DEGREES
T AXIS: 62 DEGREES
T OFFSET: 408 MS
VENTRICULAR RATE: 78 BPM

## 2024-03-17 NOTE — PROGRESS NOTES
Transition of care note: Received this patient in signout pending EPAT placement for suicidal ideation.  Patient made stable throughout his time in the emergency department while under my care.  He was ultimately signed out to oncoming provider still pending placement.    ED Course as of 03/17/24 0748   Sun Dec 24, 2023   0523 Discussed with EPAT and they recommend admission.  [JA]   0730 Received patient in signout.  Patient is pending EPAT placement for suicidal ideation. [ZS]   1618 Hand off to  [ZS]   Mon Dec 25, 2023   2006 Patient has been medically cleared for inpatient psychiatric admission. [MK]   Thu Dec 28, 2023   0339 Received patient in signout for placement.  Patient has been accepted to [facility] behavioral unit by  [ZS]      ED Course User Index  [JA] Michael Crenshaw DO  [MK] Celio Yung MD  [ZS] Ricky Arellano MD         Diagnoses as of 03/17/24 0748   Suicidal ideation      Vitals:    12/28/23 0552   BP: 127/64   Pulse: 93   Resp: 17   Temp: 36.5 °C (97.7 °F)   SpO2: 96%     No results found for this or any previous visit (from the past 24 hour(s)).   Procedures